# Patient Record
Sex: MALE | Race: BLACK OR AFRICAN AMERICAN | NOT HISPANIC OR LATINO | Employment: FULL TIME | URBAN - METROPOLITAN AREA
[De-identification: names, ages, dates, MRNs, and addresses within clinical notes are randomized per-mention and may not be internally consistent; named-entity substitution may affect disease eponyms.]

---
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
## 2025-01-11 ENCOUNTER — OFFICE VISIT (OUTPATIENT)
Dept: URGENT CARE | Facility: CLINIC | Age: 20
End: 2025-01-11
Payer: COMMERCIAL

## 2025-01-11 VITALS
SYSTOLIC BLOOD PRESSURE: 116 MMHG | OXYGEN SATURATION: 100 % | TEMPERATURE: 99 F | RESPIRATION RATE: 16 BRPM | HEIGHT: 71 IN | HEART RATE: 79 BPM | BODY MASS INDEX: 20.86 KG/M2 | DIASTOLIC BLOOD PRESSURE: 68 MMHG | WEIGHT: 149 LBS

## 2025-01-11 DIAGNOSIS — J02.0 STREP PHARYNGITIS: Primary | ICD-10-CM

## 2025-01-11 DIAGNOSIS — R09.89 SYMPTOMS OF UPPER RESPIRATORY INFECTION (URI): ICD-10-CM

## 2025-01-11 LAB
CTP QC/QA: YES
MOLECULAR STREP A: POSITIVE

## 2025-01-11 PROCEDURE — 99213 OFFICE O/P EST LOW 20 MIN: CPT | Mod: S$PBB,,,

## 2025-01-11 PROCEDURE — 99214 OFFICE O/P EST MOD 30 MIN: CPT | Mod: PBBFAC

## 2025-01-11 PROCEDURE — 87651 STREP A DNA AMP PROBE: CPT | Mod: PBBFAC

## 2025-01-11 RX ORDER — AMOXICILLIN 875 MG/1
875 TABLET, FILM COATED ORAL EVERY 12 HOURS
Qty: 20 TABLET | Refills: 0 | Status: SHIPPED | OUTPATIENT
Start: 2025-01-11 | End: 2025-01-21

## 2025-01-11 NOTE — PROGRESS NOTES
"Subjective:       Patient ID: Pranav Aguila is a 19 y.o. male.    Vitals:  height is 5' 11" (1.803 m) and weight is 67.6 kg (149 lb). His temperature is 98.8 °F (37.1 °C). His blood pressure is 116/68 and his pulse is 79. His respiration is 16 and oxygen saturation is 100%.     Chief Complaint: URI (Runny nose, HA, sore throat since Wednesday. )    19-year-old male reports to the clinic with complaints of rhinorrhea, headache, sore throat, and pain when swallowing that began on Wednesday.  Patient states this pain was exacerbated while he was at work outside in the cold today and states that he had to leave work early to come to the clinic for treatment.  Patient states he has not taken any over-the-counter medication for this issue and denies fever, nausea, vomiting, diarrhea, headache, rash.    All other systems are negative    Chart reviewed    Objective:   Physical Exam   Constitutional: He appears well-developed.  Non-toxic appearance. He does not appear ill. No distress.   HENT:   Head: Normocephalic and atraumatic.   Ears:   Right Ear: Hearing, tympanic membrane, external ear and ear canal normal.   Left Ear: Hearing, tympanic membrane, external ear and ear canal normal.   Nose: Mucosal edema and rhinorrhea present. No purulent discharge. Right sinus exhibits no maxillary sinus tenderness and no frontal sinus tenderness. Left sinus exhibits no maxillary sinus tenderness and no frontal sinus tenderness.   Mouth/Throat: Uvula is midline. Oropharyngeal exudate, posterior oropharyngeal edema and posterior oropharyngeal erythema present.   Eyes: Right eye exhibits no discharge. Left eye exhibits no discharge. Extraocular movement intact   Neck: Neck supple. No neck rigidity present.   Cardiovascular: Regular rhythm.   Pulmonary/Chest: Effort normal and breath sounds normal. No respiratory distress. He has no wheezes. He has no rhonchi. He has no rales.   Lymphadenopathy:     He has no cervical adenopathy. "   Neurological: He is alert.   Skin: Skin is warm, dry and not diaphoretic.   Psychiatric: His behavior is normal.   Nursing note and vitals reviewed.      Assessment:     1. Strep pharyngitis    2. Symptoms of upper respiratory infection (URI)          Results for orders placed or performed in visit on 01/11/25   POCT Strep A, Molecular    Collection Time: 01/11/25 12:51 PM   Result Value Ref Range    Molecular Strep A, POC Positive (A) Negative     Acceptable Yes        Plan:     Take medications as directed.  Discussed contagious precautions.      Please encourage fluids and use over-the-counter medications for symptoms as needed.  Monitor closely.  Please follow instructions on patient education material.  Return to urgent care in 2-3 days if symptoms are not improving. Seek care immediately if new or worsening symptoms develop.     Strep pharyngitis  -     amoxicillin (AMOXIL) 875 MG tablet; Take 1 tablet (875 mg total) by mouth every 12 (twelve) hours. for 10 days  Dispense: 20 tablet; Refill: 0    Symptoms of upper respiratory infection (URI)  -     POCT Strep A, Molecular        Please note: This chart was completed via voice to text dictation. It may contain typographical/word recognition errors. If there are any questions, please contact the provider for final clarification.

## 2025-01-11 NOTE — LETTER
January 11, 2025      [facility]  [facility] [address]  Phone: [phone]       Patient: Pranav Aguila   YOB: 2005  Date of Visit: 01/11/2025    To Whom It May Concern:    Alli Aguila  was at [facility] on 01/11/2025. The patient may return to work/school on 1/13/2025 with no restrictions. If you have any questions or concerns, or if I can be of further assistance, please do not hesitate to contact me.    Sincerely,    HOLLY Doran

## 2025-01-30 ENCOUNTER — OFFICE VISIT (OUTPATIENT)
Dept: URGENT CARE | Facility: CLINIC | Age: 20
End: 2025-01-30
Payer: COMMERCIAL

## 2025-01-30 VITALS
RESPIRATION RATE: 16 BRPM | HEIGHT: 71 IN | BODY MASS INDEX: 20.4 KG/M2 | SYSTOLIC BLOOD PRESSURE: 121 MMHG | HEART RATE: 62 BPM | DIASTOLIC BLOOD PRESSURE: 73 MMHG | OXYGEN SATURATION: 100 % | TEMPERATURE: 98 F | WEIGHT: 145.75 LBS

## 2025-01-30 DIAGNOSIS — R52 BODY ACHES: ICD-10-CM

## 2025-01-30 DIAGNOSIS — B34.9 VIRAL SYNDROME: Primary | ICD-10-CM

## 2025-01-30 LAB
CTP QC/QA: YES
CTP QC/QA: YES
POC MOLECULAR INFLUENZA A AGN: NEGATIVE
POC MOLECULAR INFLUENZA B AGN: NEGATIVE
SARS-COV-2 AG RESP QL IA.RAPID: NEGATIVE

## 2025-01-30 PROCEDURE — 87502 INFLUENZA DNA AMP PROBE: CPT | Mod: PBBFAC | Performed by: FAMILY MEDICINE

## 2025-01-30 PROCEDURE — 99214 OFFICE O/P EST MOD 30 MIN: CPT | Mod: PBBFAC | Performed by: FAMILY MEDICINE

## 2025-01-30 PROCEDURE — 99213 OFFICE O/P EST LOW 20 MIN: CPT | Mod: S$PBB,,, | Performed by: FAMILY MEDICINE

## 2025-01-30 PROCEDURE — 87811 SARS-COV-2 COVID19 W/OPTIC: CPT | Mod: PBBFAC | Performed by: FAMILY MEDICINE

## 2025-01-30 RX ORDER — ONDANSETRON 8 MG/1
8 TABLET, ORALLY DISINTEGRATING ORAL EVERY 8 HOURS PRN
Qty: 10 TABLET | Refills: 0 | Status: SHIPPED | OUTPATIENT
Start: 2025-01-30

## 2025-01-30 NOTE — PROGRESS NOTES
"Subjective:       Patient ID: Pranav Aguila is a 19 y.o. male.    Vitals:  height is 5' 11" (1.803 m) and weight is 66.1 kg (145 lb 11.6 oz). His temperature is 98.4 °F (36.9 °C). His blood pressure is 121/73 and his pulse is 62. His respiration is 16 and oxygen saturation is 100%.     Chief Complaint: Emesis (Body aches,  x today )    Patient began with vomiting today.  No abdominal pain.  No diarrhea.  No fever    Emesis   This is a new problem. The current episode started today. The problem occurs intermittently. The problem has been gradually improving. There has been no fever. Associated symptoms include myalgias. Pertinent negatives include no abdominal pain, chest pain, chills, coughing, decreased urine volume, diarrhea, dizziness, fever, headaches or sweats. He has tried nothing for the symptoms.         Constitution: Negative for chills and fever.   Cardiovascular:  Negative for chest pain.   Respiratory:  Negative for cough.    Gastrointestinal:  Positive for vomiting. Negative for abdominal pain and diarrhea.   Genitourinary:  Negative for urine decreased.   Musculoskeletal:  Positive for muscle ache.   Neurological:  Negative for dizziness and headaches.       Objective:   Physical Exam   Constitutional: He appears well-developed.  Non-toxic appearance. He does not appear ill. No distress.   Cardiovascular: Regular rhythm.   Pulmonary/Chest: Effort normal and breath sounds normal.   Abdominal: He exhibits no distension. Soft. There is no abdominal tenderness. There is no rebound, no guarding, no left CVA tenderness and no right CVA tenderness.   Musculoskeletal: Normal range of motion.         General: Normal range of motion.   Neurological: He is alert.   Skin: Skin is warm, dry and not diaphoretic.   Nursing note and vitals reviewed.      Results for orders placed or performed in visit on 01/30/25   POCT Influenza A/B Molecular    Collection Time: 01/30/25  5:34 PM   Result Value Ref Range    POC " Molecular Influenza A Ag Negative Negative    POC Molecular Influenza B Ag Negative Negative     Acceptable Yes    SARS Coronavirus 2 Antigen, POCT Manual Read    Collection Time: 01/30/25  5:35 PM   Result Value Ref Range    SARS Coronavirus 2 Antigen Negative Negative     Acceptable Yes        Assessment:     1. Viral syndrome    2. Body aches          Plan:   If a medication was prescribed, please take as directed.  Drink plenty of fluids. Slowly advance diet as tolerated.    Please follow instructions on patient education material.  Return if not improving in several days, sooner if new or worsening symptoms develop.    Viral syndrome  -     ondansetron (ZOFRAN-ODT) 8 MG TbDL; Take 1 tablet (8 mg total) by mouth every 8 (eight) hours as needed (nausea).  Dispense: 10 tablet; Refill: 0    Body aches  -     POCT Influenza A/B Molecular  -     SARS Coronavirus 2 Antigen, POCT Manual Read        Please note: This chart was completed via voice to text dictation. It may contain typographical/word recognition errors. If there are any questions, please contact the provider for final clarification.

## 2025-01-30 NOTE — LETTER
January 30, 2025      [facility]  [facility] [address]  Phone: [phone]       Patient: Pranav Aguila   YOB: 2005  Date of Visit: 01/30/2025    To Whom It May Concern:    Alli Aguila  was at [facility] on 01/30/2025. The patient may return to work/school on FEB 1 2025 with no restrictions. If you have any questions or concerns, or if I can be of further assistance, please do not hesitate to contact me.    Sincerely,    NAKIA STRONG MD

## 2025-02-18 ENCOUNTER — OFFICE VISIT (OUTPATIENT)
Dept: URGENT CARE | Facility: CLINIC | Age: 20
End: 2025-02-18
Payer: COMMERCIAL

## 2025-02-18 VITALS
DIASTOLIC BLOOD PRESSURE: 69 MMHG | BODY MASS INDEX: 20.44 KG/M2 | SYSTOLIC BLOOD PRESSURE: 106 MMHG | HEART RATE: 81 BPM | HEIGHT: 71 IN | RESPIRATION RATE: 16 BRPM | OXYGEN SATURATION: 100 % | TEMPERATURE: 98 F | WEIGHT: 146 LBS

## 2025-02-18 DIAGNOSIS — J06.9 UPPER RESPIRATORY TRACT INFECTION, UNSPECIFIED TYPE: ICD-10-CM

## 2025-02-18 DIAGNOSIS — R11.2 NAUSEA AND VOMITING, UNSPECIFIED VOMITING TYPE: Primary | ICD-10-CM

## 2025-02-18 LAB
CTP QC/QA: YES
MOLECULAR STREP A: NEGATIVE

## 2025-02-18 PROCEDURE — 87651 STREP A DNA AMP PROBE: CPT | Mod: PBBFAC | Performed by: FAMILY MEDICINE

## 2025-02-18 PROCEDURE — 99213 OFFICE O/P EST LOW 20 MIN: CPT | Mod: PBBFAC | Performed by: FAMILY MEDICINE

## 2025-02-18 RX ORDER — ONDANSETRON 8 MG/1
8 TABLET, ORALLY DISINTEGRATING ORAL 3 TIMES DAILY PRN
Qty: 20 TABLET | Refills: 1 | Status: SHIPPED | OUTPATIENT
Start: 2025-02-18

## 2025-02-18 NOTE — LETTER
February 18, 2025      [facility]  [facility] [address]  Phone: [phone]       Patient: Pranav Aguila   YOB: 2005  Date of Visit: 02/18/2025    To Whom It May Concern:    Alli Aguila  was at [facility] on 02/18/2025. The patient may return to work/school on 02/19/25 with no restrictions. If you have any questions or concerns, or if I can be of further assistance, please do not hesitate to contact me.    Sincerely,    GINA Joyce MD

## 2025-02-19 NOTE — PROGRESS NOTES
"Subjective:       Patient ID: Pranav Aguila is a 19 y.o. male.    Chief Complaint: nausea and vomiting  (Pt c/o n/v , headache, yesterday )      HPI  19-year-old male with nausea and vomiting since yesterday.  He has also had a headache.  He had several coworkers who were positive for flu and COVID.  Over-the-counter medication has been minimally effective.  Review of Systems   Gastrointestinal:         As above         Objective:       Vital Signs  Temp: 98.3 °F (36.8 °C)  Pulse: 81  Resp: 16  SpO2: 100 %  BP: 106/69  Patient Position: Sitting  Height and Weight  Height: 5' 11" (180.3 cm)  Weight: 66.2 kg (146 lb)  BSA (Calculated - sq m): 1.82 sq meters  BMI (Calculated): 20.4  Weight in (lb) to have BMI = 25: 178.9]  Physical Exam  Vitals reviewed.   Constitutional:       Appearance: Normal appearance.   HENT:      Head: Normocephalic and atraumatic.      Nose: Congestion present. No rhinorrhea.      Mouth/Throat:      Pharynx: Posterior oropharyngeal erythema present. No oropharyngeal exudate.   Eyes:      Extraocular Movements: Extraocular movements intact.      Conjunctiva/sclera: Conjunctivae normal.   Cardiovascular:      Rate and Rhythm: Normal rate and regular rhythm.      Heart sounds: Normal heart sounds.   Pulmonary:      Breath sounds: Normal breath sounds.   Musculoskeletal:      Cervical back: No tenderness.   Lymphadenopathy:      Cervical: No cervical adenopathy.   Skin:     General: Skin is warm and dry.   Neurological:      General: No focal deficit present.      Mental Status: He is alert.   Psychiatric:         Mood and Affect: Mood normal.         Behavior: Behavior normal.         Assessment:       Problem List Items Addressed This Visit    None  Visit Diagnoses         Nausea and vomiting, unspecified vomiting type    -  Primary    Relevant Medications    ondansetron (ZOFRAN-ODT) 8 MG TbDL    Other Relevant Orders    POCT Strep A, Molecular (Completed)      Upper respiratory tract infection, " unspecified type                Plan:   Encouraged bland diet and increase fluids for the next few days  ER precautions  FU with PCP